# Patient Record
Sex: FEMALE | Race: WHITE | NOT HISPANIC OR LATINO | Employment: FULL TIME | URBAN - METROPOLITAN AREA
[De-identification: names, ages, dates, MRNs, and addresses within clinical notes are randomized per-mention and may not be internally consistent; named-entity substitution may affect disease eponyms.]

---

## 2018-07-09 ENCOUNTER — PATIENT OUTREACH (OUTPATIENT)
Dept: ADMINISTRATIVE | Facility: HOSPITAL | Age: 38
End: 2018-07-09

## 2018-07-09 NOTE — LETTER
July 9, 2018    Leila Mckeon  [address] [place] [address]             [facility]  [address]  [facility] [address]  Phone: [phone] Dear Ms. Mckeon:    [facility] is committed to your overall health.  To help you get the most out of each of your visits, we will review your information to make sure you are up to date on all of your recommended tests and/or procedures.      As a new patient to Dr. Lock, we may not have your complete medical records.  She has found that your chart shows you may be due for Fasting cholesterol labs, Pap smear, and a Tetanus Immunization.    If you have had any of the above done at another facility, please bring the records or information with you so that your record at [facility] will be complete.      If you are currently taking medication, please bring it with you to your appointment for review.      If you have any questions or concerns, please don't hesitate to call.    Sincerely,    Melonie Lucas  Clinical Care Coordinator  [facility] [address] [facility] [address]  [place] [address]  Phone: [phone]   Fax: [phone]

## 2018-07-09 NOTE — PROGRESS NOTES
Health Maintenance Due   Topic Date Due    Lipid Panel  1982    TETANUS VACCINE  12/29/2000    Pap Smear with HPV Cotest  12/29/2003     Pre-visit outreach via mail

## 2018-07-24 ENCOUNTER — OFFICE VISIT (OUTPATIENT)
Dept: FAMILY MEDICINE | Facility: CLINIC | Age: 38
End: 2018-07-24
Payer: COMMERCIAL

## 2018-07-24 VITALS
HEART RATE: 64 BPM | BODY MASS INDEX: 30.27 KG/M2 | HEIGHT: 65 IN | SYSTOLIC BLOOD PRESSURE: 124 MMHG | DIASTOLIC BLOOD PRESSURE: 70 MMHG | TEMPERATURE: 98 F | RESPIRATION RATE: 18 BRPM | WEIGHT: 181.69 LBS

## 2018-07-24 DIAGNOSIS — F41.0 PANIC ATTACK: ICD-10-CM

## 2018-07-24 DIAGNOSIS — R42 VERTIGO: ICD-10-CM

## 2018-07-24 DIAGNOSIS — R53.83 FATIGUE, UNSPECIFIED TYPE: Primary | ICD-10-CM

## 2018-07-24 DIAGNOSIS — G47.33 OSA (OBSTRUCTIVE SLEEP APNEA): ICD-10-CM

## 2018-07-24 DIAGNOSIS — F41.1 GAD (GENERALIZED ANXIETY DISORDER): ICD-10-CM

## 2018-07-24 LAB
ALBUMIN SERPL BCP-MCNC: 4 G/DL
ALP SERPL-CCNC: 51 U/L
ALT SERPL W/O P-5'-P-CCNC: 8 U/L
ANION GAP SERPL CALC-SCNC: 7 MMOL/L
AST SERPL-CCNC: 19 U/L
BASOPHILS # BLD AUTO: 0.04 K/UL
BASOPHILS NFR BLD: 0.6 %
BILIRUB SERPL-MCNC: 0.6 MG/DL
BUN SERPL-MCNC: 12 MG/DL
CALCIUM SERPL-MCNC: 9.6 MG/DL
CHLORIDE SERPL-SCNC: 105 MMOL/L
CHOLEST SERPL-MCNC: 199 MG/DL
CHOLEST/HDLC SERPL: 4.1 {RATIO}
CO2 SERPL-SCNC: 25 MMOL/L
CREAT SERPL-MCNC: 0.8 MG/DL
DIFFERENTIAL METHOD: ABNORMAL
EOSINOPHIL # BLD AUTO: 0.1 K/UL
EOSINOPHIL NFR BLD: 1.1 %
ERYTHROCYTE [DISTWIDTH] IN BLOOD BY AUTOMATED COUNT: 14 %
EST. GFR  (AFRICAN AMERICAN): >60 ML/MIN/1.73 M^2
EST. GFR  (NON AFRICAN AMERICAN): >60 ML/MIN/1.73 M^2
ESTIMATED AVG GLUCOSE: 97 MG/DL
GLUCOSE SERPL-MCNC: 89 MG/DL
HBA1C MFR BLD HPLC: 5 %
HCT VFR BLD AUTO: 38.5 %
HDLC SERPL-MCNC: 48 MG/DL
HDLC SERPL: 24.1 %
HGB BLD-MCNC: 12.1 G/DL
IMM GRANULOCYTES # BLD AUTO: 0.01 K/UL
IMM GRANULOCYTES NFR BLD AUTO: 0.2 %
LDLC SERPL CALC-MCNC: 130.8 MG/DL
LYMPHOCYTES # BLD AUTO: 2.2 K/UL
LYMPHOCYTES NFR BLD: 33.5 %
MCH RBC QN AUTO: 25.9 PG
MCHC RBC AUTO-ENTMCNC: 31.4 G/DL
MCV RBC AUTO: 82 FL
MONOCYTES # BLD AUTO: 0.6 K/UL
MONOCYTES NFR BLD: 9.9 %
NEUTROPHILS # BLD AUTO: 3.5 K/UL
NEUTROPHILS NFR BLD: 54.7 %
NONHDLC SERPL-MCNC: 151 MG/DL
NRBC BLD-RTO: 0 /100 WBC
PLATELET # BLD AUTO: 325 K/UL
PMV BLD AUTO: 11.8 FL
POTASSIUM SERPL-SCNC: 4 MMOL/L
PROT SERPL-MCNC: 7.4 G/DL
RBC # BLD AUTO: 4.68 M/UL
SODIUM SERPL-SCNC: 137 MMOL/L
TRIGL SERPL-MCNC: 101 MG/DL
TSH SERPL DL<=0.005 MIU/L-ACNC: 0.69 UIU/ML
WBC # BLD AUTO: 6.47 K/UL

## 2018-07-24 PROCEDURE — 99203 OFFICE O/P NEW LOW 30 MIN: CPT | Mod: 25,S$GLB,, | Performed by: FAMILY MEDICINE

## 2018-07-24 PROCEDURE — 80061 LIPID PANEL: CPT

## 2018-07-24 PROCEDURE — 84443 ASSAY THYROID STIM HORMONE: CPT

## 2018-07-24 PROCEDURE — 36415 COLL VENOUS BLD VENIPUNCTURE: CPT | Mod: S$GLB,,, | Performed by: FAMILY MEDICINE

## 2018-07-24 PROCEDURE — 85025 COMPLETE CBC W/AUTO DIFF WBC: CPT

## 2018-07-24 PROCEDURE — 80053 COMPREHEN METABOLIC PANEL: CPT

## 2018-07-24 PROCEDURE — 83036 HEMOGLOBIN GLYCOSYLATED A1C: CPT

## 2018-07-24 PROCEDURE — 90471 IMMUNIZATION ADMIN: CPT | Mod: S$GLB,,, | Performed by: FAMILY MEDICINE

## 2018-07-24 PROCEDURE — 3008F BODY MASS INDEX DOCD: CPT | Mod: CPTII,S$GLB,, | Performed by: FAMILY MEDICINE

## 2018-07-24 PROCEDURE — 90715 TDAP VACCINE 7 YRS/> IM: CPT | Mod: S$GLB,,, | Performed by: FAMILY MEDICINE

## 2018-07-24 RX ORDER — ESCITALOPRAM OXALATE 20 MG/1
1 TABLET ORAL NIGHTLY
Status: ON HOLD | COMMUNITY
Start: 2018-07-23 | End: 2019-11-20

## 2018-07-24 RX ORDER — ALPRAZOLAM 0.5 MG/1
1 TABLET ORAL
Status: ON HOLD | COMMUNITY
Start: 2018-07-23 | End: 2019-11-20

## 2018-07-24 NOTE — PROGRESS NOTES
Venipuncture performed with 21 gauge butterfly, x's 1 attempt,  to L Antecubital vein.  Specimens collected per orders.      Pressure dressing applied to site, instructed patient to remove dressing in 10-15 minutes, OK to re-adjust dressing if pressure causing any discomfort, to observe closely for numbness and/or discoloration to hand or fingers, and to notify provider if bleeding persists after applying constant pressure lasting 30 minutes.

## 2018-07-27 ENCOUNTER — PATIENT MESSAGE (OUTPATIENT)
Dept: FAMILY MEDICINE | Facility: CLINIC | Age: 38
End: 2018-07-27

## 2018-08-08 NOTE — PROGRESS NOTES
Subjective:       Patient ID: Leila Mckeon is a 37 y.o. female.    Chief Complaint: Establish Care and Fatigue    HPI   The patient is a 37-year-old who is here today as a new patient to establish care.  Today, we discussed the followin) fatigue.  She does feel tired a lot.  She typically sleeps for 8 hr at night.  She does have snoring and pausing in her breathing at night.    2)  anxiety.  She has had anxiety issues since high school.  She has occasional panic attacks but these are better than they used to be.  She is taking Lexapro and an occasional Xanax which are the helping.   3) nausea.  She has had some intermittent nausea.  The nausea typically occurs in the morning.  She feels the nausea in her chest.  She wonders if this may be reflux related.  She denies any vomiting or abdominal pain.  She typically drinks water and the nausea goes away.    Review of systems is remarkable for vertigo when she turns her head to the right      Review of Systems   Constitutional: Positive for fatigue. Negative for appetite change, chills, diaphoresis, fever and unexpected weight change.   HENT: Negative for congestion, ear pain, postnasal drip, rhinorrhea, sinus pressure, sneezing, sore throat and trouble swallowing.    Eyes: Negative for pain, discharge and visual disturbance.   Respiratory: Negative for cough, chest tightness, shortness of breath and wheezing.    Cardiovascular: Negative for chest pain, palpitations and leg swelling.   Gastrointestinal: Positive for nausea. Negative for abdominal distention, abdominal pain, blood in stool, constipation, diarrhea and vomiting.   Skin: Negative for rash.   Neurological: Positive for dizziness.   Psychiatric/Behavioral: Negative for dysphoric mood, self-injury, sleep disturbance and suicidal ideas. The patient is nervous/anxious.        Objective:      Physical Exam   Constitutional: She is oriented to person, place, and time. She appears well-developed and  "well-nourished. No distress.   HENT:   Head: Normocephalic and atraumatic.   Right Ear: Hearing, tympanic membrane, external ear and ear canal normal.   Left Ear: Hearing, tympanic membrane, external ear and ear canal normal.   Nose: Nose normal.   Mouth/Throat: Oropharynx is clear and moist and mucous membranes are normal. No oral lesions. No oropharyngeal exudate, posterior oropharyngeal edema or posterior oropharyngeal erythema.   Eyes: Conjunctivae, EOM and lids are normal. Pupils are equal, round, and reactive to light. No scleral icterus.   Neck: Normal range of motion. Neck supple. Carotid bruit is not present. No thyroid mass and no thyromegaly present.   Cardiovascular: Normal rate, regular rhythm and normal heart sounds.   No extrasystoles are present. PMI is not displaced.  Exam reveals no gallop.    No murmur heard.  Pulmonary/Chest: Effort normal and breath sounds normal. No accessory muscle usage. No respiratory distress.   Clear to auscultation bilaterally.   Abdominal: Soft. Normal appearance and bowel sounds are normal. She exhibits no abdominal bruit. There is no hepatosplenomegaly. There is no tenderness. There is no rebound.   Lymphadenopathy:        Head (right side): No submental and no submandibular adenopathy present.        Head (left side): No submental and no submandibular adenopathy present.        Right cervical: No superficial cervical, no deep cervical and no posterior cervical adenopathy present.       Left cervical: No superficial cervical, no deep cervical and no posterior cervical adenopathy present.        Right: No supraclavicular adenopathy present.        Left: No supraclavicular adenopathy present.   Neurological: She is alert and oriented to person, place, and time.   Skin: Skin is warm, dry and intact.   Psychiatric: She has a normal mood and affect.     Blood pressure 124/70, pulse 64, temperature 98.3 °F (36.8 °C), temperature source Oral, resp. rate 18, height 5' 5" " (1.651 m), weight 82.4 kg (181 lb 10.5 oz), last menstrual period 07/23/2018, not currently breastfeeding.Body mass index is 30.23 kg/m².          A/P:  1) fatigue.  Persistent but new to me.  We will check labs.  We will also pursue a sleep evaluation  2) anxiety with panic attacks.  New to me.  Well controlled.  Continue Lexapro and p.r.n. Xanax.      3) nausea.  Intermittent.  New to me.  We did discuss treatment options and testing options.  For now, we will monitor symptoms and if she develops any new or worsening symptoms if her symptoms persist, she will let me know  4)  vertigo probable BPV related.  New to me.   We will refer her to ENT for further evaluation  5) health maintenance issues.  We will check fasting labs.  We will administer Tdap.

## 2018-08-15 ENCOUNTER — TELEPHONE (OUTPATIENT)
Dept: FAMILY MEDICINE | Facility: CLINIC | Age: 38
End: 2018-08-15

## 2018-08-17 ENCOUNTER — PATIENT MESSAGE (OUTPATIENT)
Dept: FAMILY MEDICINE | Facility: CLINIC | Age: 38
End: 2018-08-17

## 2018-08-28 ENCOUNTER — OFFICE VISIT (OUTPATIENT)
Dept: FAMILY MEDICINE | Facility: CLINIC | Age: 38
End: 2018-08-28
Payer: COMMERCIAL

## 2018-08-28 VITALS
WEIGHT: 175 LBS | SYSTOLIC BLOOD PRESSURE: 130 MMHG | HEART RATE: 78 BPM | RESPIRATION RATE: 18 BRPM | DIASTOLIC BLOOD PRESSURE: 70 MMHG | HEIGHT: 65 IN | TEMPERATURE: 98 F | BODY MASS INDEX: 29.16 KG/M2

## 2018-08-28 DIAGNOSIS — R63.4 UNINTENDED WEIGHT LOSS: ICD-10-CM

## 2018-08-28 DIAGNOSIS — R68.81 EARLY SATIETY: ICD-10-CM

## 2018-08-28 DIAGNOSIS — Z00.00 ANNUAL PHYSICAL EXAM: Primary | ICD-10-CM

## 2018-08-28 PROCEDURE — 88175 CYTOPATH C/V AUTO FLUID REDO: CPT

## 2018-08-28 PROCEDURE — 87624 HPV HI-RISK TYP POOLED RSLT: CPT

## 2018-08-28 PROCEDURE — 99395 PREV VISIT EST AGE 18-39: CPT | Mod: S$GLB,,, | Performed by: FAMILY MEDICINE

## 2018-08-28 RX ORDER — NORETHINDRONE ACETATE AND ETHINYL ESTRADIOL 1MG-20(21)
1 KIT ORAL DAILY
Qty: 30 TABLET | Refills: 3 | Status: ON HOLD | OUTPATIENT
Start: 2018-08-28 | End: 2019-11-20

## 2018-08-30 NOTE — PROGRESS NOTES
Subjective:       Patient ID: Leila Mckeon is a 37 y.o. female.    Chief Complaint: Well Women Exam (Pap)    HPI   The patient is a 37-year-old who is here today for her well-woman exam.  Her Pap smears have always been normal.  Her cycles are occurring every 30 days.  At times, her cycles can be heavy.  Her cycles typically last for 5-7 days.  She denies any significant pain with her cycles.  She is sexually active and is interested in birth control.  She is trying to consume a healthy diet.  She is trying to stay physically active.    Review of systems is remarkable for recent weight loss.  As we discuss this further, she notes that her appetite has been recently decreased and she is eating less because she feels full quickly.  She denies any trouble swallowing.  She denies any acid reflux.  She denies any abdominal pain.  She denies any diarrhea or constipation.  Her mom has suggested she have further evaluation perhaps with the gastroenterologist for this     Review of Systems   Constitutional: Positive for activity change and unexpected weight change. Negative for appetite change, chills, diaphoresis, fatigue and fever.   HENT: Negative for congestion, ear pain, hearing loss, postnasal drip, rhinorrhea, sinus pressure, sneezing, sore throat and trouble swallowing.    Eyes: Negative for pain, discharge and visual disturbance.   Respiratory: Negative for cough, chest tightness, shortness of breath and wheezing.    Cardiovascular: Positive for palpitations. Negative for chest pain and leg swelling.   Gastrointestinal: Negative for abdominal distention, abdominal pain, blood in stool, constipation, diarrhea, nausea and vomiting.        Per HPI   Endocrine: Negative for polydipsia and polyuria.   Genitourinary: Negative for difficulty urinating, dysuria, hematuria and menstrual problem.   Musculoskeletal: Negative for arthralgias, joint swelling and neck pain.   Skin: Negative for rash.   Neurological: Positive  for headaches. Negative for weakness.   Psychiatric/Behavioral: Negative for confusion and dysphoric mood.       Objective:      Physical Exam   Constitutional: She is oriented to person, place, and time. She appears well-developed and well-nourished.   HENT:   Head: Normocephalic and atraumatic.   Right Ear: Hearing, tympanic membrane, external ear and ear canal normal.   Left Ear: Hearing, tympanic membrane, external ear and ear canal normal.   Nose: Nose normal.   Mouth/Throat: Oropharynx is clear and moist and mucous membranes are normal.   Eyes: Conjunctivae, EOM and lids are normal. Pupils are equal, round, and reactive to light.   Neck: Normal range of motion. Neck supple. Carotid bruit is not present. No thyroid mass and no thyromegaly present.   Cardiovascular: Normal rate, regular rhythm and normal heart sounds.   No murmur heard.  Pulses:       Dorsalis pedis pulses are 2+ on the right side, and 2+ on the left side.        Posterior tibial pulses are 2+ on the right side, and 2+ on the left side.   Pulmonary/Chest: Effort normal and breath sounds normal.   Clear to auscultation bilaterally.     Abdominal: Soft. Normal appearance and bowel sounds are normal. She exhibits no abdominal bruit. There is no hepatosplenomegaly. There is no tenderness.   Genitourinary: Pelvic exam was performed with patient supine. There is no rash or lesion on the right labia. There is no rash or lesion on the left labia.   Genitourinary Comments: External genitalia appear normal.  Speculum is inserted.  Vaginal mucosa is normal.  Cervix is visualized and appears normal.  Pap is taken.  Bimanual exam reveals a normal sized uterus and ovaries which are non tender.   Feet:   Right Foot:   Skin Integrity: Positive for warmth and dry skin.   Lymphadenopathy:        Head (right side): No submental, no submandibular, no preauricular, no posterior auricular and no occipital adenopathy present.        Head (left side): No submental, no  "submandibular, no preauricular and no occipital adenopathy present.     She has no cervical adenopathy.     She has no axillary adenopathy.        Right: No supraclavicular adenopathy present.        Left: No supraclavicular adenopathy present.   Neurological: She is alert and oriented to person, place, and time. She has normal strength. No cranial nerve deficit or sensory deficit.   Skin: Skin is warm, dry and intact. No cyanosis. Nails show no clubbing.   Psychiatric: She has a normal mood and affect. Her speech is normal and behavior is normal. Thought content normal. Cognition and memory are normal.   Breast:  The breasts appear symmetric.  There is no nipple discharge or nipple inversion noted.  There are no masses palpable throughout either breast.  There is no supraclavicular or axillary lymphadenopathy.      Blood pressure 130/70, pulse 78, temperature 97.8 °F (36.6 °C), temperature source Oral, resp. rate 18, height 5' 5" (1.651 m), weight 79.4 kg (175 lb), last menstrual period 08/22/2018.Body mass index is 29.12 kg/m².        A/P:  1) annual exam.  Health maintenance issues and anticipatory guidance issues were discussed.  We will let her know when her Pap smear and HPV test results are back.  She will continue to consume a healthy  diet and exercise as regularly as possible.  We start junel for birth control starting this weekend.    2)  Weight loss with early satiety and anorexia.  New.  We are going to do an abdominal ultrasound.  If this is normal, we will refer her to a gastroenterologist for further evaluation and consideration of additional testing to possibly include an EGD    As long as she does well, I will see her back in 3 months or sooner if needed.  If she has any problems with the junel, she will let me know.   "

## 2018-09-01 ENCOUNTER — PATIENT MESSAGE (OUTPATIENT)
Dept: FAMILY MEDICINE | Facility: CLINIC | Age: 38
End: 2018-09-01

## 2018-09-05 LAB
HPV HR 12 DNA CVX QL NAA+PROBE: NEGATIVE
HPV16 AG SPEC QL: NEGATIVE
HPV18 DNA SPEC QL NAA+PROBE: NEGATIVE

## 2018-09-06 ENCOUNTER — PATIENT MESSAGE (OUTPATIENT)
Dept: FAMILY MEDICINE | Facility: CLINIC | Age: 38
End: 2018-09-06

## 2018-09-10 ENCOUNTER — HOSPITAL ENCOUNTER (OUTPATIENT)
Dept: RADIOLOGY | Facility: HOSPITAL | Age: 38
Discharge: HOME | End: 2018-09-10
Attending: FAMILY MEDICINE
Payer: COMMERCIAL

## 2018-09-10 DIAGNOSIS — R68.81 EARLY SATIETY: ICD-10-CM

## 2018-09-10 DIAGNOSIS — R63.4 UNINTENDED WEIGHT LOSS: ICD-10-CM

## 2018-09-10 PROCEDURE — 76700 US EXAM ABDOM COMPLETE: CPT | Mod: TC,PO

## 2018-09-10 PROCEDURE — 76700 US EXAM ABDOM COMPLETE: CPT | Mod: 26,,, | Performed by: RADIOLOGY

## 2018-09-12 ENCOUNTER — PATIENT MESSAGE (OUTPATIENT)
Dept: FAMILY MEDICINE | Facility: CLINIC | Age: 38
End: 2018-09-12

## 2018-09-12 DIAGNOSIS — R63.4 UNINTENDED WEIGHT LOSS: Primary | ICD-10-CM

## 2018-09-12 DIAGNOSIS — R68.81 EARLY SATIETY: ICD-10-CM

## 2018-09-19 ENCOUNTER — PATIENT MESSAGE (OUTPATIENT)
Dept: FAMILY MEDICINE | Facility: CLINIC | Age: 38
End: 2018-09-19

## 2018-09-27 ENCOUNTER — TELEPHONE (OUTPATIENT)
Dept: GASTROENTEROLOGY | Facility: CLINIC | Age: 38
End: 2018-09-27

## 2018-09-27 NOTE — TELEPHONE ENCOUNTER
----- Message from Sandra Vazquez sent at 9/27/2018 11:48 AM CDT -----  Contact: pt  Pt is requesting to speak with a nurse in regards to her procedure scheduled for tomorrow.  Please call pt to advise  Call back   Thanks

## 2019-03-28 ENCOUNTER — PATIENT MESSAGE (OUTPATIENT)
Dept: FAMILY MEDICINE | Facility: CLINIC | Age: 39
End: 2019-03-28

## 2019-09-12 PROBLEM — O16.9 HYPERTENSION AFFECTING PREGNANCY: Status: ACTIVE | Noted: 2019-09-12

## 2019-10-08 ENCOUNTER — PATIENT MESSAGE (OUTPATIENT)
Dept: ADMINISTRATIVE | Facility: HOSPITAL | Age: 39
End: 2019-10-08

## 2019-10-30 PROBLEM — O16.3 ELEVATED BLOOD PRESSURE AFFECTING PREGNANCY IN THIRD TRIMESTER, ANTEPARTUM: Status: ACTIVE | Noted: 2019-10-30

## 2019-11-22 PROBLEM — O14.13 SEVERE PREECLAMPSIA, THIRD TRIMESTER: Status: ACTIVE | Noted: 2019-11-22

## 2019-11-22 PROBLEM — O41.00X0 OLIGOHYDRAMNIOS DELIVERED: Status: ACTIVE | Noted: 2019-11-22

## 2019-11-23 PROBLEM — Z34.90 TERM PREGNANCY: Status: ACTIVE | Noted: 2019-11-23

## 2020-05-05 ENCOUNTER — PATIENT MESSAGE (OUTPATIENT)
Dept: ADMINISTRATIVE | Facility: HOSPITAL | Age: 40
End: 2020-05-05

## 2020-10-05 ENCOUNTER — PATIENT MESSAGE (OUTPATIENT)
Dept: ADMINISTRATIVE | Facility: HOSPITAL | Age: 40
End: 2020-10-05

## 2021-01-04 ENCOUNTER — PATIENT MESSAGE (OUTPATIENT)
Dept: ADMINISTRATIVE | Facility: HOSPITAL | Age: 41
End: 2021-01-04

## 2021-03-04 PROBLEM — O02.1 MISSED ABORTION: Status: ACTIVE | Noted: 2021-03-04

## 2021-04-06 ENCOUNTER — PATIENT MESSAGE (OUTPATIENT)
Dept: ADMINISTRATIVE | Facility: HOSPITAL | Age: 41
End: 2021-04-06

## 2021-05-10 ENCOUNTER — PATIENT MESSAGE (OUTPATIENT)
Dept: RESEARCH | Facility: HOSPITAL | Age: 41
End: 2021-05-10

## 2021-07-07 ENCOUNTER — PATIENT MESSAGE (OUTPATIENT)
Dept: ADMINISTRATIVE | Facility: HOSPITAL | Age: 41
End: 2021-07-07

## 2022-09-08 PROBLEM — O13.3 PIH (PREGNANCY INDUCED HYPERTENSION), THIRD TRIMESTER: Status: ACTIVE | Noted: 2022-09-08

## 2022-09-08 PROBLEM — O99.019 ANEMIA COMPLICATING PREGNANCY: Status: ACTIVE | Noted: 2022-09-08

## 2022-09-08 PROBLEM — O09.523 AMA (ADVANCED MATERNAL AGE) MULTIGRAVIDA 35+, THIRD TRIMESTER: Status: ACTIVE | Noted: 2022-09-08

## 2022-12-12 PROBLEM — O13.3 PIH (PREGNANCY INDUCED HYPERTENSION), THIRD TRIMESTER: Status: RESOLVED | Noted: 2022-09-08 | Resolved: 2022-12-12

## 2023-03-17 ENCOUNTER — PATIENT MESSAGE (OUTPATIENT)
Dept: RESEARCH | Facility: HOSPITAL | Age: 43
End: 2023-03-17